# Patient Record
Sex: FEMALE | Race: WHITE | Employment: OTHER | URBAN - METROPOLITAN AREA
[De-identification: names, ages, dates, MRNs, and addresses within clinical notes are randomized per-mention and may not be internally consistent; named-entity substitution may affect disease eponyms.]

---

## 2021-02-05 ENCOUNTER — TELEPHONE (OUTPATIENT)
Dept: PHYSICAL THERAPY | Facility: HOSPITAL | Age: 51
End: 2021-02-05

## 2021-02-05 ENCOUNTER — OFFICE VISIT (OUTPATIENT)
Dept: FAMILY MEDICINE CLINIC | Facility: CLINIC | Age: 51
End: 2021-02-05
Payer: COMMERCIAL

## 2021-02-05 ENCOUNTER — MED REC SCAN ONLY (OUTPATIENT)
Dept: FAMILY MEDICINE CLINIC | Facility: CLINIC | Age: 51
End: 2021-02-05

## 2021-02-05 VITALS
HEIGHT: 64 IN | SYSTOLIC BLOOD PRESSURE: 110 MMHG | WEIGHT: 141 LBS | HEART RATE: 97 BPM | DIASTOLIC BLOOD PRESSURE: 72 MMHG | OXYGEN SATURATION: 98 % | TEMPERATURE: 98 F | BODY MASS INDEX: 24.07 KG/M2 | RESPIRATION RATE: 16 BRPM

## 2021-02-05 DIAGNOSIS — K59.00 CONSTIPATION, UNSPECIFIED CONSTIPATION TYPE: ICD-10-CM

## 2021-02-05 DIAGNOSIS — G89.29 CHRONIC RIGHT-SIDED LOW BACK PAIN WITHOUT SCIATICA: Primary | ICD-10-CM

## 2021-02-05 DIAGNOSIS — M51.36 DISC DEGENERATION, LUMBAR: ICD-10-CM

## 2021-02-05 DIAGNOSIS — M54.50 CHRONIC RIGHT-SIDED LOW BACK PAIN WITHOUT SCIATICA: Primary | ICD-10-CM

## 2021-02-05 PROCEDURE — 99203 OFFICE O/P NEW LOW 30 MIN: CPT | Performed by: FAMILY MEDICINE

## 2021-02-05 PROCEDURE — 3008F BODY MASS INDEX DOCD: CPT | Performed by: FAMILY MEDICINE

## 2021-02-05 PROCEDURE — 3078F DIAST BP <80 MM HG: CPT | Performed by: FAMILY MEDICINE

## 2021-02-05 PROCEDURE — 3074F SYST BP LT 130 MM HG: CPT | Performed by: FAMILY MEDICINE

## 2021-02-05 RX ORDER — DICLOFENAC SODIUM 75 MG/1
75 TABLET, DELAYED RELEASE ORAL 2 TIMES DAILY
Qty: 60 TABLET | Refills: 0 | Status: SHIPPED | OUTPATIENT
Start: 2021-02-05 | End: 2021-03-24

## 2021-02-05 NOTE — PATIENT INSTRUCTIONS
Start diclofenac 75 mg 1 tabletka rano I wieczorem z kori. [facility]ac[facility]. Umowic sie na terapie.

## 2021-02-22 ENCOUNTER — OFFICE VISIT (OUTPATIENT)
Dept: PHYSICAL THERAPY | Facility: HOSPITAL | Age: 51
End: 2021-02-22
Attending: FAMILY MEDICINE
Payer: COMMERCIAL

## 2021-02-22 DIAGNOSIS — M51.36 DISC DEGENERATION, LUMBAR: ICD-10-CM

## 2021-02-22 DIAGNOSIS — G89.29 CHRONIC RIGHT-SIDED LOW BACK PAIN WITHOUT SCIATICA: ICD-10-CM

## 2021-02-22 DIAGNOSIS — M54.50 CHRONIC RIGHT-SIDED LOW BACK PAIN WITHOUT SCIATICA: ICD-10-CM

## 2021-02-22 PROCEDURE — 97161 PT EVAL LOW COMPLEX 20 MIN: CPT

## 2021-02-22 PROCEDURE — 97110 THERAPEUTIC EXERCISES: CPT

## 2021-02-23 NOTE — PROGRESS NOTES
SPINE EVALUATION:   Referring Physician: Dr. Estefany Christianson  Diagnosis: R LBP     Date of Service: 2/22/2021     PATIENT Rosalba Rubio is a 52year old female who presents to therapy today with complaints of R LBP for > 6 months.  Saw 2 chiropractors WFL but with pain provocation at end range flexion and R sidebend in wt bearing.     Segmental mobility at L3,4,5,S1 unremarkable  Palpation: no acute tenderness to palpation    Strength: (* denotes performed with pain)  LE   Hip flexion (L2): R 5/5; L 5/5 Activities, Therapeutic Exercise, Home Exercise Program instruction and Modalities to include: IASTM    Education or treatment limitation: Communication  Rehab Potential:good        Patient/Family/Caregiver was advised of these findings, precautions, and t

## 2021-02-25 ENCOUNTER — OFFICE VISIT (OUTPATIENT)
Dept: PHYSICAL THERAPY | Facility: HOSPITAL | Age: 51
End: 2021-02-25
Attending: FAMILY MEDICINE
Payer: COMMERCIAL

## 2021-02-25 DIAGNOSIS — G89.29 CHRONIC RIGHT-SIDED LOW BACK PAIN WITHOUT SCIATICA: ICD-10-CM

## 2021-02-25 DIAGNOSIS — M51.36 DISC DEGENERATION, LUMBAR: ICD-10-CM

## 2021-02-25 DIAGNOSIS — M54.50 CHRONIC RIGHT-SIDED LOW BACK PAIN WITHOUT SCIATICA: ICD-10-CM

## 2021-02-25 PROCEDURE — 97110 THERAPEUTIC EXERCISES: CPT

## 2021-02-25 PROCEDURE — 97140 MANUAL THERAPY 1/> REGIONS: CPT

## 2021-02-26 NOTE — PROGRESS NOTES
Dx: R LBP        Insurance (Authorized # of Visits):  6          Authorizing Physician: Dr. Tien Pena  Next MD visit: none scheduled  Fall Risk: standard         Precautions: n/a             Subjective:No change from initial visit.  No pain provocation w/

## 2021-03-01 ENCOUNTER — OFFICE VISIT (OUTPATIENT)
Dept: PHYSICAL THERAPY | Facility: HOSPITAL | Age: 51
End: 2021-03-01
Attending: FAMILY MEDICINE
Payer: COMMERCIAL

## 2021-03-01 DIAGNOSIS — G89.29 CHRONIC RIGHT-SIDED LOW BACK PAIN WITHOUT SCIATICA: ICD-10-CM

## 2021-03-01 DIAGNOSIS — M51.36 DISC DEGENERATION, LUMBAR: ICD-10-CM

## 2021-03-01 DIAGNOSIS — M54.50 CHRONIC RIGHT-SIDED LOW BACK PAIN WITHOUT SCIATICA: ICD-10-CM

## 2021-03-01 PROCEDURE — 97140 MANUAL THERAPY 1/> REGIONS: CPT

## 2021-03-02 NOTE — PROGRESS NOTES
Dx: R LBP        Insurance (Authorized # of Visits):  6          Authorizing Physician: Dr. Jami May MD visit: none scheduled  Fall Risk: standard         Precautions: n/a             Subjective:Pain was less Sat and Sunday. Rates at 2/10.  Increase limited in rotation ex  Charges: MT3      Total Timed Treatment: 38min  Total Treatment Time: 45 min

## 2021-03-04 ENCOUNTER — OFFICE VISIT (OUTPATIENT)
Dept: PHYSICAL THERAPY | Facility: HOSPITAL | Age: 51
End: 2021-03-04
Attending: FAMILY MEDICINE
Payer: COMMERCIAL

## 2021-03-04 DIAGNOSIS — G89.29 CHRONIC RIGHT-SIDED LOW BACK PAIN WITHOUT SCIATICA: ICD-10-CM

## 2021-03-04 DIAGNOSIS — M51.36 DISC DEGENERATION, LUMBAR: ICD-10-CM

## 2021-03-04 DIAGNOSIS — M54.50 CHRONIC RIGHT-SIDED LOW BACK PAIN WITHOUT SCIATICA: ICD-10-CM

## 2021-03-04 PROCEDURE — 97140 MANUAL THERAPY 1/> REGIONS: CPT

## 2021-03-05 NOTE — PROGRESS NOTES
Dx: R LBP        Insurance (Authorized # of Visits):  6          Authorizing Physician: Dr. Renata May MD visit: none scheduled  Fall Risk: standard         Precautions: n/a             Subjective: 2 days of decreased pain following last vist but mercedes

## 2021-03-08 ENCOUNTER — OFFICE VISIT (OUTPATIENT)
Dept: PHYSICAL THERAPY | Facility: HOSPITAL | Age: 51
End: 2021-03-08
Attending: FAMILY MEDICINE
Payer: COMMERCIAL

## 2021-03-08 DIAGNOSIS — M54.50 CHRONIC RIGHT-SIDED LOW BACK PAIN WITHOUT SCIATICA: ICD-10-CM

## 2021-03-08 DIAGNOSIS — G89.29 CHRONIC RIGHT-SIDED LOW BACK PAIN WITHOUT SCIATICA: ICD-10-CM

## 2021-03-08 DIAGNOSIS — M51.36 DISC DEGENERATION, LUMBAR: ICD-10-CM

## 2021-03-08 PROCEDURE — 97140 MANUAL THERAPY 1/> REGIONS: CPT

## 2021-03-08 PROCEDURE — 97035 APP MDLTY 1+ULTRASOUND EA 15: CPT

## 2021-03-09 NOTE — PROGRESS NOTES
Dx: R LBP        Insurance (Authorized # of Visits):  6          Authorizing Physician: Dr. Maria Ines Sharma  Next MD visit: none scheduled  Fall Risk: standard         Precautions: n/a             Subjective:Pain increased on Sat and Sun requiring medication.  D glut, thoraco lumbar paraspinals in prone over pillow  Cont'd w/ manual STM and concluded w/ ice massage                         HEP: cat/camel added, ROM limited in rotation ex  Charges: MT2, US      Total Timed Treatment: 38min  Total Treatment Time: 50

## 2021-03-11 ENCOUNTER — OFFICE VISIT (OUTPATIENT)
Dept: PHYSICAL THERAPY | Facility: HOSPITAL | Age: 51
End: 2021-03-11
Attending: FAMILY MEDICINE
Payer: COMMERCIAL

## 2021-03-11 DIAGNOSIS — G89.29 CHRONIC RIGHT-SIDED LOW BACK PAIN WITHOUT SCIATICA: ICD-10-CM

## 2021-03-11 DIAGNOSIS — M51.36 DISC DEGENERATION, LUMBAR: ICD-10-CM

## 2021-03-11 DIAGNOSIS — M54.50 CHRONIC RIGHT-SIDED LOW BACK PAIN WITHOUT SCIATICA: ICD-10-CM

## 2021-03-11 PROCEDURE — 97110 THERAPEUTIC EXERCISES: CPT

## 2021-03-12 NOTE — PROGRESS NOTES
Progress Summary  Pt has attended 6,visits in Physical Therapy. Subjective: No significant pain relief with PT so far. Has good days and bad days  Without known pattern. Pain continues on R side. Would like to try more exercise if that would help.  Do contact me at Dept: [phone]. Sincerely,  Electronically signed by therapist: Indy Kong PT    Physician's certification required: Yes  Please co-sign or sign and return this letter via fax as soon as possible to [phone].    I certify the ne

## 2021-03-15 ENCOUNTER — APPOINTMENT (OUTPATIENT)
Dept: PHYSICAL THERAPY | Facility: HOSPITAL | Age: 51
End: 2021-03-15
Attending: FAMILY MEDICINE
Payer: COMMERCIAL

## 2021-03-18 ENCOUNTER — APPOINTMENT (OUTPATIENT)
Dept: PHYSICAL THERAPY | Facility: HOSPITAL | Age: 51
End: 2021-03-18
Attending: FAMILY MEDICINE
Payer: COMMERCIAL

## 2021-03-24 RX ORDER — DICLOFENAC SODIUM 75 MG/1
75 TABLET, DELAYED RELEASE ORAL 2 TIMES DAILY
Qty: 60 TABLET | Refills: 0 | Status: SHIPPED | OUTPATIENT
Start: 2021-03-24 | End: 2021-05-13 | Stop reason: DRUGHIGH

## 2021-03-24 NOTE — TELEPHONE ENCOUNTER
Pt requesting a refill of     Diclofenac Sodium 75 MG Oral Tab EC 60 tablet 0 2/5/2021     Sig - Route:  Take 1 tablet (75 mg total) by mouth 2 (two) times daily. - Oral    Sent to pharmacy as: Diclofenac Sodium 75 MG Oral Tablet Delayed Release (VOLTAREN)

## 2021-04-27 ENCOUNTER — TELEPHONE (OUTPATIENT)
Dept: FAMILY MEDICINE CLINIC | Facility: CLINIC | Age: 51
End: 2021-04-27

## 2021-04-27 RX ORDER — DICLOFENAC SODIUM 75 MG/1
75 TABLET, DELAYED RELEASE ORAL 2 TIMES DAILY
Qty: 30 TABLET | Refills: 0 | Status: SHIPPED | OUTPATIENT
Start: 2021-04-27 | End: 2021-05-13

## 2021-04-27 NOTE — TELEPHONE ENCOUNTER
I have called in diclofenac 30 tablets for this patient so she can continue medication until seen in the office for reevaluation.   Thank you

## 2021-04-27 NOTE — TELEPHONE ENCOUNTER
PT would like refill of Diclofenac Sodium 75 MG Oral Tab EC sent to [facility] [id number] [facility][id number] Rommel Meraz, [address] AT [address], [phone], [phone] she is out of medication

## 2021-04-27 NOTE — TELEPHONE ENCOUNTER
Patient was asked to return post PT at last OV on 2/5/21; Patient has a follow-up appointment r/t her back on 5/13/2021. Is it ok to fill the Diclofenac Sodium 75 MG Oral Tab EC-->Sig:   Take 1 tablet (75 mg total) by mouth 2 (two) times daily.     Dr. Enmanuel Galicia

## 2021-04-28 NOTE — TELEPHONE ENCOUNTER
Call to pt-sts does not need  today. Advised of info noted below from dr Vu Pham re refill request.  Patient voices understanding/agrees with plan/no further questions.

## 2021-04-30 RX ORDER — DICLOFENAC SODIUM 75 MG/1
TABLET, DELAYED RELEASE ORAL
Qty: 60 TABLET | Refills: 0 | OUTPATIENT
Start: 2021-04-30

## 2021-05-05 ENCOUNTER — IMMUNIZATION (OUTPATIENT)
Dept: LAB | Age: 51
End: 2021-05-05

## 2021-05-05 DIAGNOSIS — Z23 NEED FOR VACCINATION: Primary | ICD-10-CM

## 2021-05-05 PROCEDURE — 91300 COVID 19 PFIZER-BIONTECH: CPT

## 2021-05-05 PROCEDURE — 0001A COVID 19 PFIZER-BIONTECH: CPT

## 2021-05-13 ENCOUNTER — OFFICE VISIT (OUTPATIENT)
Dept: FAMILY MEDICINE CLINIC | Facility: CLINIC | Age: 51
End: 2021-05-13
Payer: COMMERCIAL

## 2021-05-13 VITALS
TEMPERATURE: 97 F | WEIGHT: 144.5 LBS | RESPIRATION RATE: 16 BRPM | HEART RATE: 80 BPM | OXYGEN SATURATION: 99 % | BODY MASS INDEX: 24.67 KG/M2 | HEIGHT: 64 IN | DIASTOLIC BLOOD PRESSURE: 70 MMHG | SYSTOLIC BLOOD PRESSURE: 120 MMHG

## 2021-05-13 DIAGNOSIS — G89.29 CHRONIC RIGHT-SIDED LOW BACK PAIN WITHOUT SCIATICA: ICD-10-CM

## 2021-05-13 DIAGNOSIS — M51.36 DISC DEGENERATION, LUMBAR: Primary | ICD-10-CM

## 2021-05-13 DIAGNOSIS — M54.50 CHRONIC RIGHT-SIDED LOW BACK PAIN WITHOUT SCIATICA: ICD-10-CM

## 2021-05-13 DIAGNOSIS — R23.2 HOT FLASHES: ICD-10-CM

## 2021-05-13 DIAGNOSIS — R14.0 ABDOMINAL BLOATING: ICD-10-CM

## 2021-05-13 PROCEDURE — 3008F BODY MASS INDEX DOCD: CPT | Performed by: FAMILY MEDICINE

## 2021-05-13 PROCEDURE — 3078F DIAST BP <80 MM HG: CPT | Performed by: FAMILY MEDICINE

## 2021-05-13 PROCEDURE — 99214 OFFICE O/P EST MOD 30 MIN: CPT | Performed by: FAMILY MEDICINE

## 2021-05-13 PROCEDURE — 3074F SYST BP LT 130 MM HG: CPT | Performed by: FAMILY MEDICINE

## 2021-05-13 RX ORDER — DICLOFENAC SODIUM 75 MG/1
75 TABLET, DELAYED RELEASE ORAL 2 TIMES DAILY
Qty: 30 TABLET | Refills: 1 | Status: SHIPPED | OUTPATIENT
Start: 2021-05-13 | End: 2021-10-25

## 2021-05-13 NOTE — PATIENT INSTRUCTIONS
Umowic sie na X- ray back. Diclofenac 75 mg 1 tab rano I wieczorem ligia potrzephilip z kori. Estroven Hot flashes na uderzenia ronaldaca. Probiotic- Align, Culturelle- na travienie/gazy. Gas X- ligia potrzeba na wzdecia/gaz. Anne Agent.    Pic [address]

## 2021-05-13 NOTE — PROGRESS NOTES
Jade Current is a 54year old female. cc low back pain, hot flashes, gas/bloating  HPI:   Patient is coming to the office for follow-up of the low back pain.   She has back pain which is on the right side going to the hip most of the time it is in the lower b rashes  HEENT no congestion no runny nose no sore throat  Neck no neck pain  RESPIRATORY: denies shortness of breath with exertion  CARDIOVASCULAR: denies chest pain on exertion  GI: denies abdominal pain and denies heartburn, abdominal bloating  NEURO: de Schedule x-ray of your lumbar spine. Diclofenac 75 mg take 1 tablet twice a day as needed with food. Estroven hot flashes use as needed for hot flashes. Probiotic align Culturelle for digestion/excessive gas  Gas-X as needed for excessive gas.   Heal

## 2021-05-26 ENCOUNTER — IMMUNIZATION (OUTPATIENT)
Dept: LAB | Age: 51
End: 2021-05-26
Attending: HOSPITALIST

## 2021-05-26 DIAGNOSIS — Z23 NEED FOR VACCINATION: Primary | ICD-10-CM

## 2021-05-26 PROCEDURE — 0002A COVID 19 PFIZER-BIONTECH: CPT | Performed by: HOSPITALIST

## 2021-05-26 PROCEDURE — 91300 COVID 19 PFIZER-BIONTECH: CPT | Performed by: HOSPITALIST

## 2021-07-19 ENCOUNTER — TELEPHONE (OUTPATIENT)
Dept: FAMILY MEDICINE CLINIC | Facility: CLINIC | Age: 51
End: 2021-07-19

## 2021-07-19 DIAGNOSIS — G89.29 CHRONIC RIGHT-SIDED LOW BACK PAIN WITHOUT SCIATICA: Primary | ICD-10-CM

## 2021-07-19 DIAGNOSIS — M54.50 CHRONIC RIGHT-SIDED LOW BACK PAIN WITHOUT SCIATICA: Primary | ICD-10-CM

## 2021-07-19 DIAGNOSIS — R93.7 ABNORMAL FINDINGS ON DIAGNOSTIC IMAGING OF SPINE: ICD-10-CM

## 2021-07-19 NOTE — TELEPHONE ENCOUNTER
Pt requesting referral to neurosurgeon Dr. Oneill Necessary, [address] [facility], [address] [place] [address] per Dr. Marlin Irizarry recommendation. Pt has scheduled an appt this Thursday 7/22/2021 with neurosurgeon.

## 2021-07-20 NOTE — TELEPHONE ENCOUNTER
Record shows L/S xr results w comments from dr Patrick Casillas advising referral to dr Mariam Sarkar but no referral order placed. Referral order placed now w same dx and abnormal imaging.    Call to pt to confirm she has spoken w insurance to confirm this provider

## 2021-07-20 NOTE — TELEPHONE ENCOUNTER
Pt returned call. Notified pt that referral was placed and confirmed with patient that she will check with her insurance coverage to verify if Dr. Priscila Nguyen is in network.

## 2021-07-21 NOTE — TELEPHONE ENCOUNTER
Pt states when she called to scheduled an appt, she was placed with the physician assistant. Pt was wondering if this was okay.      Please advise

## 2021-07-21 NOTE — TELEPHONE ENCOUNTER
Updated dr Geovanny Washington noted below. She confirms appt w PA is fine-will still be under the supervision of dr Emelia Loredo. Call to pt-advised of above info from dr Chuy Otero. Patient voices understanding/agrees with plan/no further questions.

## 2021-07-22 ENCOUNTER — TELEPHONE (OUTPATIENT)
Dept: SURGERY | Facility: CLINIC | Age: 51
End: 2021-07-22

## 2021-07-22 ENCOUNTER — OFFICE VISIT (OUTPATIENT)
Dept: SURGERY | Facility: CLINIC | Age: 51
End: 2021-07-22
Payer: COMMERCIAL

## 2021-07-22 VITALS
SYSTOLIC BLOOD PRESSURE: 110 MMHG | HEIGHT: 64 IN | WEIGHT: 144 LBS | DIASTOLIC BLOOD PRESSURE: 70 MMHG | BODY MASS INDEX: 24.59 KG/M2

## 2021-07-22 DIAGNOSIS — M54.50 CHRONIC BILATERAL LOW BACK PAIN WITHOUT SCIATICA: Primary | ICD-10-CM

## 2021-07-22 DIAGNOSIS — M51.36 DDD (DEGENERATIVE DISC DISEASE), LUMBAR: ICD-10-CM

## 2021-07-22 DIAGNOSIS — G89.29 CHRONIC BILATERAL LOW BACK PAIN WITHOUT SCIATICA: Primary | ICD-10-CM

## 2021-07-22 PROCEDURE — 99203 OFFICE O/P NEW LOW 30 MIN: CPT | Performed by: PHYSICIAN ASSISTANT

## 2021-07-22 PROCEDURE — 3074F SYST BP LT 130 MM HG: CPT | Performed by: PHYSICIAN ASSISTANT

## 2021-07-22 PROCEDURE — 3078F DIAST BP <80 MM HG: CPT | Performed by: PHYSICIAN ASSISTANT

## 2021-07-22 PROCEDURE — 3008F BODY MASS INDEX DOCD: CPT | Performed by: PHYSICIAN ASSISTANT

## 2021-07-22 RX ORDER — CYCLOBENZAPRINE HCL 10 MG
10 TABLET ORAL NIGHTLY PRN
Qty: 30 TABLET | Refills: 0 | Status: SHIPPED | OUTPATIENT
Start: 2021-07-22 | End: 2021-12-01 | Stop reason: ALTCHOICE

## 2021-07-22 NOTE — TELEPHONE ENCOUNTER
Pt brought in images on disc and report.   Uploaded to [employer], disc returned to pt at [facility]

## 2021-07-22 NOTE — H&P
Neurosurgery Clinic Visit  2021    Son [place] PCP:  Brittany Madsen MD    1970 MRN [id number]       CC:  Back Pain    HPI:    Lefty Hamilton is a very pleasant 54year old female who presents with worsening chronic LBP.   We used the [employer] Used    Vaping Use      Vaping Use: Never used    Substance and Sexual Activity      Alcohol use: Never      Drug use: Never      Sexual activity: Yes        Partners: Male    Other Topics      Concerns:        Caffeine Concern: No        Exercise:  Yes chiro.  She is not interested in surgery at this point. Recommend Pain Eval for injection recommendations. Rx flexeril, she will trial at night.   She will call with clinical update, if she is interested in surgery we will order updated MRI Lumbar and hav

## 2021-07-22 NOTE — PROGRESS NOTES
Ongoing back pain  No sx  No injections  Has had some PT  chiro    Lower back especially Right side  When the back pain flares up she feels weak all over   Morning is the worst pain  Sometimes at night at the end of the day can be bad as well

## 2021-10-25 ENCOUNTER — OFFICE VISIT (OUTPATIENT)
Dept: FAMILY MEDICINE CLINIC | Facility: CLINIC | Age: 51
End: 2021-10-25
Payer: COMMERCIAL

## 2021-10-25 VITALS
BODY MASS INDEX: 22.68 KG/M2 | WEIGHT: 137.81 LBS | TEMPERATURE: 99 F | OXYGEN SATURATION: 97 % | HEART RATE: 87 BPM | DIASTOLIC BLOOD PRESSURE: 72 MMHG | SYSTOLIC BLOOD PRESSURE: 122 MMHG | RESPIRATION RATE: 20 BRPM | HEIGHT: 65.5 IN

## 2021-10-25 DIAGNOSIS — R05.9 COUGH: ICD-10-CM

## 2021-10-25 DIAGNOSIS — J02.0 STREP PHARYNGITIS: Primary | ICD-10-CM

## 2021-10-25 DIAGNOSIS — R11.0 NAUSEA IN ADULT: ICD-10-CM

## 2021-10-25 DIAGNOSIS — J02.9 SORE THROAT: ICD-10-CM

## 2021-10-25 PROCEDURE — 3078F DIAST BP <80 MM HG: CPT | Performed by: NURSE PRACTITIONER

## 2021-10-25 PROCEDURE — 87880 STREP A ASSAY W/OPTIC: CPT | Performed by: NURSE PRACTITIONER

## 2021-10-25 PROCEDURE — 3008F BODY MASS INDEX DOCD: CPT | Performed by: NURSE PRACTITIONER

## 2021-10-25 PROCEDURE — 99213 OFFICE O/P EST LOW 20 MIN: CPT | Performed by: NURSE PRACTITIONER

## 2021-10-25 PROCEDURE — 3074F SYST BP LT 130 MM HG: CPT | Performed by: NURSE PRACTITIONER

## 2021-10-25 RX ORDER — AMOXICILLIN 500 MG/1
500 CAPSULE ORAL 2 TIMES DAILY
Qty: 20 CAPSULE | Refills: 0 | Status: SHIPPED | OUTPATIENT
Start: 2021-10-25 | End: 2021-11-04

## 2021-10-25 RX ORDER — BENZONATATE 200 MG/1
200 CAPSULE ORAL 3 TIMES DAILY PRN
Qty: 30 CAPSULE | Refills: 0 | Status: SHIPPED | OUTPATIENT
Start: 2021-10-25 | End: 2021-12-01 | Stop reason: ALTCHOICE

## 2021-10-25 NOTE — PROGRESS NOTES
CHIEF COMPLAINT:   Patient presents with:  Sore Throat: cough x5days. loss of voice. No fever. no sick exposure. Hx of strep. taking Ibuprofen. DayQuil. HPI:   Marlo Bumpers is a 54year old female presents to clinic with symptoms of sore throat.  Osito EOM intact  EARS: TM's clear, non-injected, no bulging, retraction, or fluid  NOSE: nostrils patent, no exudates, nasal mucosa pink and noninflamed  THROAT: oral mucosa pink, moist. Posterior pharynx erythematous and injected. No exudates. Tonsils 2/4.   Br 100.4 persists for 72 hours. The patient/parent indicates understanding of these issues and agrees to the plan. The patient is asked to follow up with their PCP prn.     Patient Instructions     Self-Care for Sore Throats  Sore throats happen for many you.   Prevent future sore throats  Prevention tips include:  · Stop smoking or reduce contact with secondhand smoke. Smoke irritates the tender throat lining. · Limit contact with pets and with allergy-causing substances, such as pollen and mold.   · Wash

## 2021-11-30 PROCEDURE — 87624 HPV HI-RISK TYP POOLED RSLT: CPT | Performed by: OBSTETRICS & GYNECOLOGY

## 2021-11-30 PROCEDURE — 88175 CYTOPATH C/V AUTO FLUID REDO: CPT | Performed by: OBSTETRICS & GYNECOLOGY

## 2021-12-01 ENCOUNTER — OFFICE VISIT (OUTPATIENT)
Dept: FAMILY MEDICINE CLINIC | Facility: CLINIC | Age: 51
End: 2021-12-01
Payer: COMMERCIAL

## 2021-12-01 ENCOUNTER — LAB ENCOUNTER (OUTPATIENT)
Dept: LAB | Age: 51
End: 2021-12-01
Attending: OBSTETRICS & GYNECOLOGY
Payer: COMMERCIAL

## 2021-12-01 VITALS
SYSTOLIC BLOOD PRESSURE: 108 MMHG | HEIGHT: 65.5 IN | TEMPERATURE: 98 F | WEIGHT: 138 LBS | BODY MASS INDEX: 22.72 KG/M2 | HEART RATE: 72 BPM | RESPIRATION RATE: 16 BRPM | DIASTOLIC BLOOD PRESSURE: 70 MMHG | OXYGEN SATURATION: 99 %

## 2021-12-01 DIAGNOSIS — R53.83 FATIGUE, UNSPECIFIED TYPE: ICD-10-CM

## 2021-12-01 DIAGNOSIS — Z00.00 PHYSICAL EXAM, ANNUAL: Primary | ICD-10-CM

## 2021-12-01 DIAGNOSIS — Z00.00 LABORATORY TESTS ORDERED AS PART OF A COMPLETE PHYSICAL EXAM (CPE): ICD-10-CM

## 2021-12-01 DIAGNOSIS — N92.4 ABNORMAL PERIMENOPAUSAL BLEEDING: ICD-10-CM

## 2021-12-01 DIAGNOSIS — Z12.31 ENCOUNTER FOR SCREENING MAMMOGRAM FOR BREAST CANCER: ICD-10-CM

## 2021-12-01 PROCEDURE — 3074F SYST BP LT 130 MM HG: CPT | Performed by: FAMILY MEDICINE

## 2021-12-01 PROCEDURE — 85027 COMPLETE CBC AUTOMATED: CPT

## 2021-12-01 PROCEDURE — 3078F DIAST BP <80 MM HG: CPT | Performed by: FAMILY MEDICINE

## 2021-12-01 PROCEDURE — 83001 ASSAY OF GONADOTROPIN (FSH): CPT

## 2021-12-01 PROCEDURE — 3008F BODY MASS INDEX DOCD: CPT | Performed by: FAMILY MEDICINE

## 2021-12-01 PROCEDURE — 84443 ASSAY THYROID STIM HORMONE: CPT

## 2021-12-01 PROCEDURE — 99396 PREV VISIT EST AGE 40-64: CPT | Performed by: FAMILY MEDICINE

## 2021-12-01 RX ORDER — MULTIVIT WITH MINERALS/LUTEIN
1000 TABLET ORAL DAILY
COMMUNITY

## 2021-12-01 RX ORDER — MULTIVIT-MIN/IRON/FOLIC ACID/K 18-600-40
1 CAPSULE ORAL DAILY
COMMUNITY

## 2021-12-01 NOTE — PROGRESS NOTES
HPI:   Raghav Hsu is a 54year old female who presents for a complete physical exam. Symptoms: denies discharge, itching, burning or dysuria.  Patient has never had a mammogram done we will place an order for mammogram.  Patient will check with her insur denies abdominal pain,denies heartburn  : denies dysuria, vaginal discharge or itching,  MUSCULOSKELETAL: denies back pain  NEURO: denies headaches  PSYCHE: denies depression or anxiety  HEMATOLOGIC: denies hx of anemia  ENDOCRINE: denies thyroid history diet.  Stay active. Imaging & Consults:  [facility] PORFIRIO 2D+3D SCREENING BILAT (CPT=77067/68631)     Pap and pelvic by GYN. . Order put in for mammogram a. Self breast exam explained. Health maintenance, will check fasting Lipids, CMP, and CBC.  Pt referred f

## 2021-12-29 ENCOUNTER — LAB ENCOUNTER (OUTPATIENT)
Dept: LAB | Age: 51
End: 2021-12-29
Attending: FAMILY MEDICINE
Payer: COMMERCIAL

## 2021-12-29 DIAGNOSIS — Z00.00 LABORATORY TESTS ORDERED AS PART OF A COMPLETE PHYSICAL EXAM (CPE): ICD-10-CM

## 2021-12-29 DIAGNOSIS — R53.83 FATIGUE, UNSPECIFIED TYPE: ICD-10-CM

## 2021-12-29 LAB
ALBUMIN SERPL-MCNC: 4 G/DL (ref 3.4–5)
ALBUMIN/GLOB SERPL: 1.1 {RATIO} (ref 1–2)
ALP LIVER SERPL-CCNC: 60 U/L
ALT SERPL-CCNC: 27 U/L
ANION GAP SERPL CALC-SCNC: 6 MMOL/L (ref 0–18)
AST SERPL-CCNC: 16 U/L (ref 15–37)
BILIRUB SERPL-MCNC: 1 MG/DL (ref 0.1–2)
BILIRUB UR QL STRIP.AUTO: NEGATIVE
BUN BLD-MCNC: 15 MG/DL (ref 7–18)
CALCIUM BLD-MCNC: 9.6 MG/DL (ref 8.5–10.1)
CHLORIDE SERPL-SCNC: 104 MMOL/L (ref 98–112)
CHOLEST SERPL-MCNC: 252 MG/DL (ref ?–200)
CO2 SERPL-SCNC: 31 MMOL/L (ref 21–32)
COLOR UR AUTO: YELLOW
CREAT BLD-MCNC: 0.61 MG/DL
FASTING PATIENT LIPID ANSWER: YES
FASTING STATUS PATIENT QL REPORTED: YES
GLOBULIN PLAS-MCNC: 3.8 G/DL (ref 2.8–4.4)
GLUCOSE BLD-MCNC: 97 MG/DL (ref 70–99)
GLUCOSE UR STRIP.AUTO-MCNC: NEGATIVE MG/DL
HDLC SERPL-MCNC: 107 MG/DL (ref 40–59)
KETONES UR STRIP.AUTO-MCNC: NEGATIVE MG/DL
LDLC SERPL CALC-MCNC: 120 MG/DL (ref ?–100)
LEUKOCYTE ESTERASE UR QL STRIP.AUTO: NEGATIVE
NITRITE UR QL STRIP.AUTO: NEGATIVE
NONHDLC SERPL-MCNC: 145 MG/DL (ref ?–130)
OSMOLALITY SERPL CALC.SUM OF ELEC: 293 MOSM/KG (ref 275–295)
PH UR STRIP.AUTO: 7 [PH] (ref 5–8)
POTASSIUM SERPL-SCNC: 4.1 MMOL/L (ref 3.5–5.1)
PROT SERPL-MCNC: 7.8 G/DL (ref 6.4–8.2)
PROT UR STRIP.AUTO-MCNC: NEGATIVE MG/DL
RBC UR QL AUTO: NEGATIVE
SODIUM SERPL-SCNC: 141 MMOL/L (ref 136–145)
SP GR UR STRIP.AUTO: 1.02 (ref 1–1.03)
TRIGL SERPL-MCNC: 149 MG/DL (ref 30–149)
UROBILINOGEN UR STRIP.AUTO-MCNC: <2 MG/DL
VIT B12 SERPL-MCNC: 790 PG/ML (ref 193–986)
VLDLC SERPL CALC-MCNC: 26 MG/DL (ref 0–30)

## 2021-12-29 PROCEDURE — 82607 VITAMIN B-12: CPT

## 2021-12-29 PROCEDURE — 80061 LIPID PANEL: CPT

## 2021-12-29 PROCEDURE — 81003 URINALYSIS AUTO W/O SCOPE: CPT

## 2021-12-29 PROCEDURE — 80053 COMPREHEN METABOLIC PANEL: CPT

## 2022-01-18 ENCOUNTER — TELEPHONE (OUTPATIENT)
Dept: FAMILY MEDICINE CLINIC | Facility: CLINIC | Age: 52
End: 2022-01-18

## 2022-01-18 NOTE — TELEPHONE ENCOUNTER
Pt called and stated that she is having the same throat pain. Pt asked if Dr. Lin Lindsey can just refill the antibiotic she's already prescribed her. I told pt she'll have to be seen and assessed.     Pt then declined and said Dr. Lin Lindsey can do

## 2022-01-18 NOTE — TELEPHONE ENCOUNTER
S/W pt and she C/O ST and itchy throat since last NOC. Pt denies any Fever, swollen glands, cough, HAs or any other URI symptoms. Pt states she just want to start on an ABX that she was given before. Informed pt that she will need to be seen.   She refus

## 2022-01-19 ENCOUNTER — HOSPITAL ENCOUNTER (OUTPATIENT)
Age: 52
Discharge: HOME | End: 2022-01-19
Payer: COMMERCIAL

## 2022-01-19 VITALS
SYSTOLIC BLOOD PRESSURE: 114 MMHG | HEART RATE: 80 BPM | TEMPERATURE: 99 F | WEIGHT: 137 LBS | OXYGEN SATURATION: 98 % | BODY MASS INDEX: 22 KG/M2 | DIASTOLIC BLOOD PRESSURE: 71 MMHG | RESPIRATION RATE: 18 BRPM

## 2022-01-19 DIAGNOSIS — J02.9 ACUTE VIRAL PHARYNGITIS: Primary | ICD-10-CM

## 2022-01-19 LAB — S PYO AG THROAT QL: NEGATIVE

## 2022-01-19 PROCEDURE — 99213 OFFICE O/P EST LOW 20 MIN: CPT

## 2022-01-19 PROCEDURE — 87880 STREP A ASSAY W/OPTIC: CPT

## 2022-01-19 PROCEDURE — 99212 OFFICE O/P EST SF 10 MIN: CPT

## 2022-01-19 RX ORDER — FLUTICASONE PROPIONATE 50 MCG
2 SPRAY, SUSPENSION (ML) NASAL DAILY
Qty: 16 G | Refills: 0 | Status: SHIPPED | OUTPATIENT
Start: 2022-01-19 | End: 2022-02-18

## 2022-01-19 NOTE — ED PROVIDER NOTES
Patient Seen in: [facility]      History   Patient presents with:  Sore Throat    Stated Complaint: sorethroat    Subjective:   57-year-old female presents today with complaints of sore throat and a slight cough.   Patient denies any fever Normocephalic. Right Ear: Tympanic membrane and ear canal normal.      Left Ear: Tympanic membrane and ear canal normal.      Nose: Mucosal edema present. Mouth/Throat:      Pharynx: Uvula midline. Posterior oropharyngeal erythema present.    Eyes Christelle Traylor MD    Jo Purvis [phone]    In 1 week  As needed          Medications Prescribed:  Current Discharge Medication List    START taking these medications    fluticasone propionate 50 MCG/ACT Nasal Suspension  2 sp

## 2022-01-19 NOTE — ED INITIAL ASSESSMENT (HPI)
Pt aox4. Pt c/o sore throat and selam ear pressure started yesterday. Pt denies fever. Pt vaccinated for Covid.

## 2022-12-17 ENCOUNTER — OFFICE VISIT (OUTPATIENT)
Dept: FAMILY MEDICINE CLINIC | Facility: CLINIC | Age: 52
End: 2022-12-17
Payer: COMMERCIAL

## 2022-12-17 VITALS
DIASTOLIC BLOOD PRESSURE: 72 MMHG | HEART RATE: 68 BPM | HEIGHT: 65.5 IN | SYSTOLIC BLOOD PRESSURE: 110 MMHG | WEIGHT: 137 LBS | RESPIRATION RATE: 14 BRPM | BODY MASS INDEX: 22.55 KG/M2 | TEMPERATURE: 98 F

## 2022-12-17 DIAGNOSIS — Z13.89 SCREENING FOR GENITOURINARY CONDITION: ICD-10-CM

## 2022-12-17 DIAGNOSIS — Z12.4 SCREENING FOR CERVICAL CANCER: ICD-10-CM

## 2022-12-17 DIAGNOSIS — Z00.00 PHYSICAL EXAM, ANNUAL: Primary | ICD-10-CM

## 2022-12-17 DIAGNOSIS — Z12.11 SCREEN FOR COLON CANCER: ICD-10-CM

## 2022-12-17 DIAGNOSIS — Z12.31 SCREENING MAMMOGRAM FOR BREAST CANCER: ICD-10-CM

## 2022-12-17 DIAGNOSIS — Z00.00 LABORATORY EXAMINATION ORDERED AS PART OF A ROUTINE GENERAL MEDICAL EXAMINATION: ICD-10-CM

## 2022-12-17 PROCEDURE — 3008F BODY MASS INDEX DOCD: CPT | Performed by: FAMILY MEDICINE

## 2022-12-17 PROCEDURE — 3078F DIAST BP <80 MM HG: CPT | Performed by: FAMILY MEDICINE

## 2022-12-17 PROCEDURE — 3074F SYST BP LT 130 MM HG: CPT | Performed by: FAMILY MEDICINE

## 2022-12-17 PROCEDURE — 99396 PREV VISIT EST AGE 40-64: CPT | Performed by: FAMILY MEDICINE

## 2022-12-30 ENCOUNTER — LAB ENCOUNTER (OUTPATIENT)
Dept: LAB | Age: 52
End: 2022-12-30
Attending: FAMILY MEDICINE
Payer: COMMERCIAL

## 2022-12-30 DIAGNOSIS — Z00.00 LABORATORY EXAMINATION ORDERED AS PART OF A ROUTINE GENERAL MEDICAL EXAMINATION: ICD-10-CM

## 2022-12-30 DIAGNOSIS — Z13.89 SCREENING FOR GENITOURINARY CONDITION: ICD-10-CM

## 2022-12-30 DIAGNOSIS — Z12.11 SCREEN FOR COLON CANCER: ICD-10-CM

## 2022-12-30 LAB
ALBUMIN SERPL-MCNC: 4.1 G/DL (ref 3.4–5)
ALBUMIN/GLOB SERPL: 1.3 {RATIO} (ref 1–2)
ALP LIVER SERPL-CCNC: 70 U/L
ALT SERPL-CCNC: 33 U/L
ANION GAP SERPL CALC-SCNC: 6 MMOL/L (ref 0–18)
AST SERPL-CCNC: 17 U/L (ref 15–37)
BASOPHILS # BLD AUTO: 0.05 X10(3) UL (ref 0–0.2)
BASOPHILS NFR BLD AUTO: 0.8 %
BILIRUB SERPL-MCNC: 1.2 MG/DL (ref 0.1–2)
BILIRUB UR QL: NEGATIVE
BUN BLD-MCNC: 17 MG/DL (ref 7–18)
BUN/CREAT SERPL: 25.8 (ref 10–20)
CALCIUM BLD-MCNC: 9.5 MG/DL (ref 8.5–10.1)
CHLORIDE SERPL-SCNC: 107 MMOL/L (ref 98–112)
CHOLEST SERPL-MCNC: 226 MG/DL (ref ?–200)
CLARITY UR: CLEAR
CO2 SERPL-SCNC: 29 MMOL/L (ref 21–32)
COLOR UR: YELLOW
CREAT BLD-MCNC: 0.66 MG/DL
DEPRECATED RDW RBC AUTO: 47.8 FL (ref 35.1–46.3)
EOSINOPHIL # BLD AUTO: 0.14 X10(3) UL (ref 0–0.7)
EOSINOPHIL NFR BLD AUTO: 2.4 %
ERYTHROCYTE [DISTWIDTH] IN BLOOD BY AUTOMATED COUNT: 13.8 % (ref 11–15)
FASTING PATIENT LIPID ANSWER: YES
FASTING STATUS PATIENT QL REPORTED: YES
GFR SERPLBLD BASED ON 1.73 SQ M-ARVRAT: 107 ML/MIN/1.73M2 (ref 60–?)
GLOBULIN PLAS-MCNC: 3.1 G/DL (ref 2.8–4.4)
GLUCOSE BLD-MCNC: 95 MG/DL (ref 70–99)
GLUCOSE UR-MCNC: NEGATIVE MG/DL
HCT VFR BLD AUTO: 40.4 %
HDLC SERPL-MCNC: 108 MG/DL (ref 40–59)
HEMOCCULT STL QL: POSITIVE
HGB BLD-MCNC: 13.2 G/DL
HGB UR QL STRIP.AUTO: NEGATIVE
IMM GRANULOCYTES # BLD AUTO: 0 X10(3) UL (ref 0–1)
IMM GRANULOCYTES NFR BLD: 0 %
KETONES UR-MCNC: NEGATIVE MG/DL
LDLC SERPL CALC-MCNC: 106 MG/DL (ref ?–100)
LEUKOCYTE ESTERASE UR QL STRIP.AUTO: NEGATIVE
LYMPHOCYTES # BLD AUTO: 1.77 X10(3) UL (ref 1–4)
LYMPHOCYTES NFR BLD AUTO: 30 %
MCH RBC QN AUTO: 30.8 PG (ref 26–34)
MCHC RBC AUTO-ENTMCNC: 32.7 G/DL (ref 31–37)
MCV RBC AUTO: 94.2 FL
MONOCYTES # BLD AUTO: 0.51 X10(3) UL (ref 0.1–1)
MONOCYTES NFR BLD AUTO: 8.6 %
NEUTROPHILS # BLD AUTO: 3.43 X10 (3) UL (ref 1.5–7.7)
NEUTROPHILS # BLD AUTO: 3.43 X10(3) UL (ref 1.5–7.7)
NEUTROPHILS NFR BLD AUTO: 58.2 %
NITRITE UR QL STRIP.AUTO: NEGATIVE
NONHDLC SERPL-MCNC: 118 MG/DL (ref ?–130)
OSMOLALITY SERPL CALC.SUM OF ELEC: 295 MOSM/KG (ref 275–295)
PH UR: 7 [PH] (ref 5–8)
PLATELET # BLD AUTO: 167 10(3)UL (ref 150–450)
POTASSIUM SERPL-SCNC: 3.9 MMOL/L (ref 3.5–5.1)
PROT SERPL-MCNC: 7.2 G/DL (ref 6.4–8.2)
PROT UR-MCNC: NEGATIVE MG/DL
RBC # BLD AUTO: 4.29 X10(6)UL
SODIUM SERPL-SCNC: 142 MMOL/L (ref 136–145)
SP GR UR STRIP: 1.02 (ref 1–1.03)
TRIGL SERPL-MCNC: 68 MG/DL (ref 30–149)
TSI SER-ACNC: 1.78 MIU/ML (ref 0.36–3.74)
UROBILINOGEN UR STRIP-ACNC: <2
VIT C UR-MCNC: NEGATIVE MG/DL
VLDLC SERPL CALC-MCNC: 11 MG/DL (ref 0–30)
WBC # BLD AUTO: 5.9 X10(3) UL (ref 4–11)

## 2022-12-30 PROCEDURE — 80053 COMPREHEN METABOLIC PANEL: CPT

## 2022-12-30 PROCEDURE — 82274 ASSAY TEST FOR BLOOD FECAL: CPT

## 2022-12-30 PROCEDURE — 85025 COMPLETE CBC W/AUTO DIFF WBC: CPT

## 2022-12-30 PROCEDURE — 80061 LIPID PANEL: CPT

## 2022-12-30 PROCEDURE — 84443 ASSAY THYROID STIM HORMONE: CPT

## 2022-12-30 PROCEDURE — 81003 URINALYSIS AUTO W/O SCOPE: CPT

## 2023-12-09 ENCOUNTER — LAB ENCOUNTER (OUTPATIENT)
Dept: LAB | Age: 53
End: 2023-12-09
Attending: FAMILY MEDICINE
Payer: COMMERCIAL

## 2023-12-09 ENCOUNTER — OFFICE VISIT (OUTPATIENT)
Dept: FAMILY MEDICINE CLINIC | Facility: CLINIC | Age: 53
End: 2023-12-09
Payer: COMMERCIAL

## 2023-12-09 VITALS
WEIGHT: 140 LBS | HEIGHT: 65.5 IN | HEART RATE: 70 BPM | SYSTOLIC BLOOD PRESSURE: 118 MMHG | BODY MASS INDEX: 23.04 KG/M2 | TEMPERATURE: 98 F | DIASTOLIC BLOOD PRESSURE: 82 MMHG | RESPIRATION RATE: 14 BRPM

## 2023-12-09 DIAGNOSIS — Z12.11 SCREEN FOR COLON CANCER: ICD-10-CM

## 2023-12-09 DIAGNOSIS — Z00.00 PHYSICAL EXAM, ANNUAL: ICD-10-CM

## 2023-12-09 DIAGNOSIS — N95.0 POSTMENOPAUSAL BLEEDING: ICD-10-CM

## 2023-12-09 DIAGNOSIS — Z13.89 SCREENING FOR GENITOURINARY CONDITION: ICD-10-CM

## 2023-12-09 DIAGNOSIS — R19.5 POSITIVE FIT (FECAL IMMUNOCHEMICAL TEST): ICD-10-CM

## 2023-12-09 DIAGNOSIS — Z00.00 LABORATORY EXAMINATION ORDERED AS PART OF A ROUTINE GENERAL MEDICAL EXAMINATION: ICD-10-CM

## 2023-12-09 DIAGNOSIS — Z12.31 SCREENING MAMMOGRAM FOR BREAST CANCER: ICD-10-CM

## 2023-12-09 DIAGNOSIS — Z12.4 SCREENING FOR CERVICAL CANCER: ICD-10-CM

## 2023-12-09 DIAGNOSIS — Z00.00 PHYSICAL EXAM, ANNUAL: Primary | ICD-10-CM

## 2023-12-09 LAB
ALBUMIN SERPL-MCNC: 4 G/DL (ref 3.4–5)
ALBUMIN/GLOB SERPL: 1.2 {RATIO} (ref 1–2)
ALP LIVER SERPL-CCNC: 69 U/L
ALT SERPL-CCNC: 24 U/L
ANION GAP SERPL CALC-SCNC: 2 MMOL/L (ref 0–18)
AST SERPL-CCNC: 21 U/L (ref 15–37)
BASOPHILS # BLD AUTO: 0.02 X10(3) UL (ref 0–0.2)
BASOPHILS NFR BLD AUTO: 0.5 %
BILIRUB SERPL-MCNC: 1.7 MG/DL (ref 0.1–2)
BUN BLD-MCNC: 14 MG/DL (ref 9–23)
CALCIUM BLD-MCNC: 8.8 MG/DL (ref 8.5–10.1)
CHLORIDE SERPL-SCNC: 110 MMOL/L (ref 98–112)
CHOLEST SERPL-MCNC: 236 MG/DL (ref ?–200)
CO2 SERPL-SCNC: 28 MMOL/L (ref 21–32)
CREAT BLD-MCNC: 0.83 MG/DL
EGFRCR SERPLBLD CKD-EPI 2021: 85 ML/MIN/1.73M2 (ref 60–?)
EOSINOPHIL # BLD AUTO: 0.1 X10(3) UL (ref 0–0.7)
EOSINOPHIL NFR BLD AUTO: 2.5 %
ERYTHROCYTE [DISTWIDTH] IN BLOOD BY AUTOMATED COUNT: 13.2 %
FASTING PATIENT LIPID ANSWER: YES
FASTING STATUS PATIENT QL REPORTED: YES
GLOBULIN PLAS-MCNC: 3.3 G/DL (ref 2.8–4.4)
GLUCOSE BLD-MCNC: 94 MG/DL (ref 70–99)
HCT VFR BLD AUTO: 42.1 %
HDLC SERPL-MCNC: 117 MG/DL (ref 40–59)
HGB BLD-MCNC: 13.8 G/DL
IMM GRANULOCYTES # BLD AUTO: 0 X10(3) UL (ref 0–1)
IMM GRANULOCYTES NFR BLD: 0 %
LDLC SERPL CALC-MCNC: 113 MG/DL (ref ?–100)
LYMPHOCYTES # BLD AUTO: 1.58 X10(3) UL (ref 1–4)
LYMPHOCYTES NFR BLD AUTO: 39.5 %
MCH RBC QN AUTO: 31.4 PG (ref 26–34)
MCHC RBC AUTO-ENTMCNC: 32.8 G/DL (ref 31–37)
MCV RBC AUTO: 95.7 FL
MONOCYTES # BLD AUTO: 0.49 X10(3) UL (ref 0.1–1)
MONOCYTES NFR BLD AUTO: 12.3 %
NEUTROPHILS # BLD AUTO: 1.81 X10 (3) UL (ref 1.5–7.7)
NEUTROPHILS # BLD AUTO: 1.81 X10(3) UL (ref 1.5–7.7)
NEUTROPHILS NFR BLD AUTO: 45.2 %
NONHDLC SERPL-MCNC: 119 MG/DL (ref ?–130)
OSMOLALITY SERPL CALC.SUM OF ELEC: 290 MOSM/KG (ref 275–295)
PLATELET # BLD AUTO: 174 10(3)UL (ref 150–450)
POTASSIUM SERPL-SCNC: 4 MMOL/L (ref 3.5–5.1)
PROT SERPL-MCNC: 7.3 G/DL (ref 6.4–8.2)
RBC # BLD AUTO: 4.4 X10(6)UL
SODIUM SERPL-SCNC: 140 MMOL/L (ref 136–145)
TRIGL SERPL-MCNC: 38 MG/DL (ref 30–149)
TSI SER-ACNC: 1.35 MIU/ML (ref 0.36–3.74)
VLDLC SERPL CALC-MCNC: 6 MG/DL (ref 0–30)
WBC # BLD AUTO: 4 X10(3) UL (ref 4–11)

## 2023-12-09 PROCEDURE — 87624 HPV HI-RISK TYP POOLED RSLT: CPT | Performed by: FAMILY MEDICINE

## 2023-12-09 PROCEDURE — 99213 OFFICE O/P EST LOW 20 MIN: CPT | Performed by: FAMILY MEDICINE

## 2023-12-09 PROCEDURE — 85025 COMPLETE CBC W/AUTO DIFF WBC: CPT

## 2023-12-09 PROCEDURE — 84443 ASSAY THYROID STIM HORMONE: CPT

## 2023-12-09 PROCEDURE — 80061 LIPID PANEL: CPT

## 2023-12-09 PROCEDURE — 80053 COMPREHEN METABOLIC PANEL: CPT

## 2023-12-09 PROCEDURE — 88175 CYTOPATH C/V AUTO FLUID REDO: CPT | Performed by: FAMILY MEDICINE

## 2023-12-09 PROCEDURE — 99396 PREV VISIT EST AGE 40-64: CPT | Performed by: FAMILY MEDICINE

## 2023-12-09 PROCEDURE — 3008F BODY MASS INDEX DOCD: CPT | Performed by: FAMILY MEDICINE

## 2023-12-09 PROCEDURE — 3074F SYST BP LT 130 MM HG: CPT | Performed by: FAMILY MEDICINE

## 2023-12-09 PROCEDURE — 3079F DIAST BP 80-89 MM HG: CPT | Performed by: FAMILY MEDICINE

## 2023-12-09 PROCEDURE — 36415 COLL VENOUS BLD VENIPUNCTURE: CPT

## 2023-12-09 RX ORDER — IBUPROFEN 800 MG/1
800 TABLET ORAL EVERY 6 HOURS
COMMUNITY
Start: 2023-10-28

## 2023-12-09 NOTE — PATIENT INSTRUCTIONS
Kylah osborn. Call [phone] to schedule Mammogram I US pelvis. Umowic sie do ginekologa I gastroenterologa. Healthy diet. Stay active. Sprobowac Gummy fiber lub Metamucil. Sproboac Senna lub Xenna na zaparcia. Umowic sie na wizyte za 3 miesiace.

## 2023-12-11 LAB — HPV I/H RISK 1 DNA SPEC QL NAA+PROBE: NEGATIVE

## 2023-12-12 ENCOUNTER — APPOINTMENT (OUTPATIENT)
Dept: LAB | Age: 53
End: 2023-12-12
Attending: FAMILY MEDICINE
Payer: COMMERCIAL

## 2023-12-13 LAB
.: NORMAL
.: NORMAL

## 2023-12-20 ENCOUNTER — TELEPHONE (OUTPATIENT)
Dept: FAMILY MEDICINE CLINIC | Facility: CLINIC | Age: 53
End: 2023-12-20

## 2023-12-20 NOTE — TELEPHONE ENCOUNTER
Per referral department:         The Rendering Provider, Dr Mikhail Hamilton, is out of network with the patient's [facility] EXCHANGE insurance plan. I am unable to process this request at this time. Please update referral with an in network Rendering Provider and I will be happy to reprocess this referral request.  Please advise the patient to contact [facility] to obtain the name of an In Network Provider.      Attempted to reach patient to notify her that she will need to contact her insurance company to obtain a provider that is network for colon cancer screening.    Left message using polish  asking patient to return phone call.

## 2024-02-01 ENCOUNTER — OFFICE VISIT (OUTPATIENT)
Dept: FAMILY MEDICINE CLINIC | Facility: CLINIC | Age: 54
End: 2024-02-01
Payer: COMMERCIAL

## 2024-02-01 VITALS
TEMPERATURE: 98 F | OXYGEN SATURATION: 97 % | HEIGHT: 65.5 IN | RESPIRATION RATE: 16 BRPM | SYSTOLIC BLOOD PRESSURE: 114 MMHG | WEIGHT: 139 LBS | HEART RATE: 100 BPM | BODY MASS INDEX: 22.88 KG/M2 | DIASTOLIC BLOOD PRESSURE: 70 MMHG

## 2024-02-01 DIAGNOSIS — J06.9 VIRAL URI WITH COUGH: ICD-10-CM

## 2024-02-01 DIAGNOSIS — J02.9 SORE THROAT: Primary | ICD-10-CM

## 2024-02-01 LAB
CONTROL LINE PRESENT WITH A CLEAR BACKGROUND (YES/NO): YES YES/NO
STREP GRP A CUL-SCR: NEGATIVE

## 2024-02-01 PROCEDURE — 87880 STREP A ASSAY W/OPTIC: CPT | Performed by: NURSE PRACTITIONER

## 2024-02-01 PROCEDURE — 99213 OFFICE O/P EST LOW 20 MIN: CPT | Performed by: NURSE PRACTITIONER

## 2024-02-01 RX ORDER — IBUPROFEN 600 MG/1
600 TABLET ORAL EVERY 6 HOURS PRN
Qty: 30 TABLET | Refills: 0 | Status: SHIPPED | OUTPATIENT
Start: 2024-02-01 | End: 2024-02-05 | Stop reason: ALTCHOICE

## 2024-02-01 RX ORDER — BENZONATATE 200 MG/1
200 CAPSULE ORAL 3 TIMES DAILY PRN
Qty: 30 CAPSULE | Refills: 0 | Status: SHIPPED | OUTPATIENT
Start: 2024-02-01 | End: 2024-02-05

## 2024-02-01 NOTE — PATIENT INSTRUCTIONS
May take Ibuprofen 600 mg every 6 hours as needed for pain. Take with food.  May use Benzonatate capsule-take one every 8 hours as needed for cough/sore throat. May make you drowsy. If not helping by the third dose you may stop.  Salt water gargles (1/2 coffee mug full of water, microwave for one minute, add 1 teaspoon of salt and swish, gargle, spit until empty) will help reduce pain and swelling.  Hydrate with hot or cold drinks for comfort, wash hands or use hand hygiene often. Cover your cough or sneeze.  Follow up in two weeks for recheck if not 100% improved; seek immediate care if shortness of breath, inability to swallow saliva or breathe.

## 2024-02-01 NOTE — PROGRESS NOTES
HPI:   Stephenie Murrieta is a 53 year old female who presents with ill symptoms for  2  days. Patient reports sore throat, congestion, dry cough, OTC cold meds have been helping. Has tried Ibuprofen 400 mg with not much relief. No known sick contacts.    Current Outpatient Medications   Medication Sig Dispense Refill    ibuprofen 800 MG Oral Tab Take 1 tablet (800 mg total) by mouth every 6 (six) hours.      Ascorbic Acid (VITAMIN C) 1000 MG Oral Tab Take 1 tablet (1,000 mg total) by mouth daily.      Cholecalciferol (VITAMIN D) 50 MCG (2000 UT) Oral Cap Take 1 capsule (2,000 Units total) by mouth daily.      Omega-3 Fatty Acids (FISH OIL OR) Take by mouth.       No current facility-administered medications for this visit.      No past medical history on file.   No past surgical history on file.   Family History   Problem Relation Age of Onset    Heart Disorder Father     Other (liver/ETOH) Sister       Social History     Socioeconomic History    Marital status: Single   Tobacco Use    Smoking status: Never    Smokeless tobacco: Never   Vaping Use    Vaping Use: Never used   Substance and Sexual Activity    Alcohol use: Never    Drug use: Never    Sexual activity: Yes     Partners: Male   Other Topics Concern    Caffeine Concern No    Exercise Yes    Seat Belt Yes         REVIEW OF SYSTEMS:   GENERAL: feels well otherwise, lost voice  HEENT: congested, as above in HPI  LUNGS: denies shortness of breath with exertion  CARDIOVASCULAR: denies chest pain on exertion  GI: no nausea or abdominal pain, appetite normal but difficult to eat  NEURO: admits to headaches    EXAM:   /70   Pulse 100   Temp 98.3 °F (36.8 °C) (Oral)   Resp 16   Ht 5' 5.5\" (1.664 m)   Wt 139 lb (63 kg)   SpO2 97%   BMI 22.78 kg/m²   GENERAL: well developed, well nourished,in no apparent distress, voice hoarse  HEENT: atraumatic, normocephalic,ears with normal appearing TMs, opaque, nares with erythema and mild rhinorrhea, throat normal  appearing. Uvuvla midline.    NECK: supple,posterior cervical adenopathy  LUNGS: clear to auscultation  CARDIO: RRR without murmur  Results for orders placed or performed in visit on 02/01/24   Strep A Assay W/Optic    Collection Time: 02/01/24  8:38 AM   Result Value Ref Range    Strep Grp A Screen Negative Negative    Control Line Present with a clear background (yes/no) Yes Yes/No    Kit Lot # [id number] Numeric    Kit Expiration Date 4/22/25 Date         ASSESSMENT AND PLAN:    PLAN:Stephenie was seen today for sore throat.    Diagnoses and all orders for this visit:    Sore throat  -     Strep A Assay W/Optic  -     benzonatate 200 MG Oral Cap; Take 1 capsule (200 mg total) by mouth 3 (three) times daily as needed for cough.  -     ibuprofen 600 MG Oral Tab; Take 1 tablet (600 mg total) by mouth every 6 (six) hours as needed for Pain.    Viral URI with cough    Negative rapid strep. No culture sent.  OTC decongestants, throat lozenges and Tylenol . Self care discussed. Medication use and risk/benefit discussed. Patient is advised to follow up with PCP if not improving with treatment plan or seek immediate care if symptoms worsen.  The patient indicates understanding of these issues and agrees to the plan.  Patient Instructions   May take Ibuprofen 600 mg every 6 hours as needed for pain. Take with food.  May use Benzonatate capsule-take one every 8 hours as needed for cough/sore throat. May make you drowsy. If not helping by the third dose you may stop.  Salt water gargles (1/2 coffee mug full of water, microwave for one minute, add 1 teaspoon of salt and swish, gargle, spit until empty) will help reduce pain and swelling.  Hydrate with hot or cold drinks for comfort, wash hands or use hand hygiene often. Cover your cough or sneeze.  Follow up in two weeks for recheck if not 100% improved; seek immediate care if shortness of breath, inability to swallow saliva or breathe.

## 2024-02-02 ENCOUNTER — HOSPITAL ENCOUNTER (OUTPATIENT)
Age: 54
Discharge: HOME | End: 2024-02-02
Payer: COMMERCIAL

## 2024-02-02 ENCOUNTER — OFFICE VISIT (OUTPATIENT)
Dept: FAMILY MEDICINE CLINIC | Facility: CLINIC | Age: 54
End: 2024-02-02
Payer: COMMERCIAL

## 2024-02-02 VITALS
HEART RATE: 100 BPM | HEIGHT: 65.5 IN | SYSTOLIC BLOOD PRESSURE: 106 MMHG | DIASTOLIC BLOOD PRESSURE: 70 MMHG | RESPIRATION RATE: 16 BRPM | BODY MASS INDEX: 22.88 KG/M2 | OXYGEN SATURATION: 98 % | WEIGHT: 139 LBS | TEMPERATURE: 99 F

## 2024-02-02 VITALS
WEIGHT: 140 LBS | HEART RATE: 90 BPM | HEIGHT: 66 IN | DIASTOLIC BLOOD PRESSURE: 84 MMHG | RESPIRATION RATE: 16 BRPM | BODY MASS INDEX: 22.5 KG/M2 | TEMPERATURE: 98 F | SYSTOLIC BLOOD PRESSURE: 121 MMHG | OXYGEN SATURATION: 99 %

## 2024-02-02 DIAGNOSIS — J02.9 SORE THROAT: Primary | ICD-10-CM

## 2024-02-02 DIAGNOSIS — H92.09 OTALGIA, UNSPECIFIED LATERALITY: ICD-10-CM

## 2024-02-02 DIAGNOSIS — J02.9 VIRAL PHARYNGITIS: Primary | ICD-10-CM

## 2024-02-02 LAB
CONTROL LINE PRESENT WITH A CLEAR BACKGROUND (YES/NO): YES YES/NO
STREP GRP A CUL-SCR: NEGATIVE

## 2024-02-02 PROCEDURE — 99213 OFFICE O/P EST LOW 20 MIN: CPT | Performed by: NURSE PRACTITIONER

## 2024-02-02 PROCEDURE — 87081 CULTURE SCREEN ONLY: CPT | Performed by: NURSE PRACTITIONER

## 2024-02-02 PROCEDURE — 99213 OFFICE O/P EST LOW 20 MIN: CPT

## 2024-02-02 PROCEDURE — 87880 STREP A ASSAY W/OPTIC: CPT | Performed by: NURSE PRACTITIONER

## 2024-02-02 RX ORDER — LIDOCAINE HYDROCHLORIDE 20 MG/ML
10 SOLUTION OROPHARYNGEAL ONCE
Status: COMPLETED | OUTPATIENT
Start: 2024-02-02 | End: 2024-02-02

## 2024-02-02 RX ORDER — LIDOCAINE HYDROCHLORIDE 20 MG/ML
5 SOLUTION OROPHARYNGEAL
Qty: 100 ML | Refills: 0 | Status: SHIPPED | OUTPATIENT
Start: 2024-02-02

## 2024-02-02 RX ORDER — NAPROXEN 500 MG/1
500 TABLET ORAL 2 TIMES DAILY PRN
Qty: 20 TABLET | Refills: 0 | Status: SHIPPED | OUTPATIENT
Start: 2024-02-02

## 2024-02-02 NOTE — DISCHARGE INSTRUCTIONS
Sore throat care measures   - Wash hands often   - Disinfect your environment, especially high-touch items   - Drink plenty of fluids to keep your throat moist   - Get plenty of rest   - Do not share utensils or drinks   - Change toothbrush   - You may benefit from salt water gargles throughout the day   - Naproxen as needed for pain (take with or after food; do not take additional Ibuprofen)   - Viscous lidocaine as needed for sore throat   - You may benefit from throat lozenges for throat pain (e.g. Cepacol, Ricola, etc.)   - Follow up with your primary care provider as needed

## 2024-02-02 NOTE — PROGRESS NOTES
HPI:   Stephenie Murrieta is a 53 year old female who presents with ill symptoms for  3  days. Patient was seen in the walk in clinic yesterday for sore throat, not relieved by Ibuprofen, complaint today is burning of sore throat with pain up to the ears, 7 on 0-10 scale with no relief from medications.     Current Outpatient Medications   Medication Sig Dispense Refill    benzonatate 200 MG Oral Cap Take 1 capsule (200 mg total) by mouth 3 (three) times daily as needed for cough. 30 capsule 0    ibuprofen 600 MG Oral Tab Take 1 tablet (600 mg total) by mouth every 6 (six) hours as needed for Pain. 30 tablet 0    ibuprofen 800 MG Oral Tab Take 1 tablet (800 mg total) by mouth every 6 (six) hours.      Ascorbic Acid (VITAMIN C) 1000 MG Oral Tab Take 1 tablet (1,000 mg total) by mouth daily.      Cholecalciferol (VITAMIN D) 50 MCG (2000 UT) Oral Cap Take 1 capsule (2,000 Units total) by mouth daily.      Omega-3 Fatty Acids (FISH OIL OR) Take by mouth.       No current facility-administered medications for this visit.      No past medical history on file.   No past surgical history on file.   Family History   Problem Relation Age of Onset    Heart Disorder Father     Other (liver/ETOH) Sister       Social History     Socioeconomic History    Marital status: Single   Tobacco Use    Smoking status: Never    Smokeless tobacco: Never   Vaping Use    Vaping Use: Never used   Substance and Sexual Activity    Alcohol use: Never    Drug use: Never    Sexual activity: Yes     Partners: Male   Other Topics Concern    Caffeine Concern No    Exercise Yes    Seat Belt Yes         REVIEW OF SYSTEMS:   GENERAL: feels well otherwise, fatigued in general  HEENT: not congested, as above in HPI  LUNGS: denies shortness of breath with exertion  CARDIOVASCULAR: denies chest pain on exertion  GI: no nausea or abdominal pain, appetite down due to sore throat  NEURO: denies headaches    EXAM:   /70   Pulse 100   Temp 98.8 °F (37.1 °C)  (Oral)   Resp 16   Ht 5' 5.5\" (1.664 m)   Wt 139 lb (63 kg)   SpO2 98%   BMI 22.78 kg/m²   GENERAL: well developed, well nourished,in no apparent distress, voice hoarse  HEENT: atraumatic, normocephalic, throat normal appearing. Uvuvla midline.  NECK: supple,posterior cervical adenopathy  LUNGS: clear to auscultation all lobes  CARDIO: RRR without murmur  Results for orders placed or performed in visit on 02/02/24   Strep A Assay W/Optic    Collection Time: 02/02/24  8:59 AM   Result Value Ref Range    Strep Grp A Screen negative Negative    Control Line Present with a clear background (yes/no) yes Yes/No    Kit Lot # [id number] Numeric    Kit Expiration Date 4/22/25 Date         ASSESSMENT AND PLAN:    PLAN:Stephenei was seen today for sore throat.    Diagnoses and all orders for this visit:    Sore throat  -     Strep A Assay W/Optic    Otalgia, unspecified laterality  -     Strep A Assay W/Optic      Negative rapid strep, will send throat culture per patient request. Patient refusing Covid testing today, long discussion of antibiotic use for bacteria and illness is likely viral. She feels very \"weak\" since having Covid vaccines and declines Covid testing due to beliefs. Patient would like to proceed to higher level of care for pain control. Will possibly go to [facility] later for treatment and evaluation.   There are no Patient Instructions on file for this visit.

## 2024-02-02 NOTE — ED PROVIDER NOTES
History     Chief Complaint   Patient presents with    Sore Throat       Subjective:   HPI    Stephenie Murrieta, 53 year old female with notable medical history of n/a who presents with sore throat. Patient reports sore throat for the past couple days w/o associated fever, n/v, HA, body aches, SOB. She was seen at [facility] yesterday, had negative strep test, and was advised on 600mg Ibuprofen. She reports no relief from Ibuprofen and returned to [facility] earlier today and had a culture sent per request. Patient came to  today for alternative therapy.         Objective:   History reviewed. No pertinent past medical history.           History reviewed. No pertinent surgical history.             Social History     Socioeconomic History    Marital status: Single   Tobacco Use    Smoking status: Never    Smokeless tobacco: Never   Vaping Use    Vaping Use: Never used   Substance and Sexual Activity    Alcohol use: Never    Drug use: Never    Sexual activity: Yes     Partners: Male   Other Topics Concern    Caffeine Concern No    Exercise Yes    Seat Belt Yes              Review of Systems   HENT:  Positive for sore throat.    All other systems reviewed and are negative.        Constitutional and vital signs reviewed.      All other systems reviewed and negative except as noted above.    I have reviewed the family history, social history, allergies, and outpatient medications.     History reviewed from EMR: Encounters, problem list, allergies, medications      Physical Exam     ED Triage Vitals [02/02/24 0935]   /84   Pulse 90   Resp 16   Temp 97.8 °F (36.6 °C)   Temp src Temporal   SpO2 99 %   O2 Device None (Room air)       Current:/84   Pulse 90   Temp 97.8 °F (36.6 °C) (Temporal)   Resp 16   Ht 167.6 cm (5' 6\")   Wt 63.5 kg   SpO2 99%   BMI 22.60 kg/m²       Physical Exam  Vitals and nursing note reviewed.   Constitutional:       General: She is not in acute distress.     Appearance: Normal appearance. She  is normal weight. She is not ill-appearing or toxic-appearing.   HENT:      Head: Normocephalic and atraumatic.      Right Ear: External ear normal.      Left Ear: External ear normal.      Nose: Nose normal.      Mouth/Throat:      Mouth: Mucous membranes are moist.      Pharynx: Oropharynx is clear. Uvula midline.      Tonsils: No tonsillar exudate. 0 on the right. 0 on the left.      Comments: No tonsillar edema / exudate / swelling. No deep erythema or petechiae.   Eyes:      Extraocular Movements: Extraocular movements intact.      Conjunctiva/sclera: Conjunctivae normal.      Pupils: Pupils are equal, round, and reactive to light.   Cardiovascular:      Rate and Rhythm: Normal rate.      Pulses: Normal pulses.   Pulmonary:      Effort: Pulmonary effort is normal. No respiratory distress.   Musculoskeletal:         General: No swelling, tenderness or signs of injury. Normal range of motion.      Cervical back: Normal range of motion and neck supple.   Skin:     General: Skin is warm and dry.      Capillary Refill: Capillary refill takes less than 2 seconds.      Coloration: Skin is not jaundiced.   Neurological:      General: No focal deficit present.      Mental Status: She is alert and oriented to person, place, and time. Mental status is at baseline.   Psychiatric:         Mood and Affect: Mood normal.         Behavior: Behavior normal.         Thought Content: Thought content normal.         Judgment: Judgment normal.            ED Course     Labs Reviewed - No data to display  No orders to display       Vitals:    02/02/24 0935   BP: 121/84   Pulse: 90   Resp: 16   Temp: 97.8 °F (36.6 °C)   TempSrc: Temporal   SpO2: 99%   Weight: 63.5 kg   Height: 167.6 cm (5' 6\")            [facility]        Stephenie Murrieta, 53 year old female with medical history as noted above who presents with sore throat   - Patient in NAD, VSS   - Viral vs strep vs other   - PATIENT with negative strep yesterday, wich culture sent from [facility]  earlier today   - Viscous lidocaine in IC and Rx for same   - Supportive care and infection control measures discussed   - f/u with primary care provider as needed        ** See ED course for additional information on care provided / interventions / notable events throughout patient's encounter.    ** See below for home care instructions (if applicable)         I have independently reviewed the radiology images, clinical lab results, and ECG tracings as described above (if applicable)        Medical Decision Making  Risk  OTC drugs.  Prescription drug management.        Disposition and Plan     Clinical Impression:  1. Viral pharyngitis         Disposition:  Discharge  2/2/2024  9:58 am    Follow-up:  Shefali Garcia MD  [facility] [place] [id number]  [phone]      As needed          Medications Prescribed:  Current Discharge Medication List        START taking these medications    Details   Lidocaine Viscous HCl 2 % Mouth/Throat Solution Take 5 mL by mouth every 3 (three) hours as needed for Pain. Spit or swallow  Qty: 100 mL, Refills: 0      naproxen 500 MG Oral Tab Take 1 tablet (500 mg total) by mouth 2 (two) times daily as needed.  Qty: 20 tablet, Refills: 0             The above patient (and/or guardian) was made aware that an appropriate evaluation has been performed, and that no additional testing is required at this time. In my medical judgment, there is currently no evidence of an immediate life-threatening or surgical condition, therefore discharge is indicated at this time. The patient (and/or guardian) was advised that a small risk still exists that a serious condition could develop. The patient was instructed to arrange close follow-up with their primary care provider (or the referral provider given today). The patient received written and verbal instructions regarding their condition / concerns, demonstrated understanding, and is agreement with the outpatient treatment plan.         Home care instructions:    Sore throat care measures   - Wash hands often   - Disinfect your environment, especially high-touch items   - Drink plenty of fluids to keep your throat moist   - Get plenty of rest   - Do not share utensils or drinks   - Change toothbrush   - You may benefit from salt water gargles throughout the day   - Naproxen as needed for pain (take with or after food; do not take additional Ibuprofen)   - Viscous lidocaine as needed for sore throat   - You may benefit from throat lozenges for throat pain (e.g. Cepacol, Ricola, etc.)   - Follow up with your primary care provider as needed      Jagdish Perry, DNP, APRN, AGACNP-BC, FNP-C, CNL  Adult-Gerontology Acute Care & Family Nurse Practitioner  [facility]

## 2024-02-05 ENCOUNTER — OFFICE VISIT (OUTPATIENT)
Dept: FAMILY MEDICINE CLINIC | Facility: CLINIC | Age: 54
End: 2024-02-05
Payer: COMMERCIAL

## 2024-02-05 ENCOUNTER — TELEPHONE (OUTPATIENT)
Dept: FAMILY MEDICINE CLINIC | Facility: CLINIC | Age: 54
End: 2024-02-05

## 2024-02-05 VITALS
WEIGHT: 142 LBS | BODY MASS INDEX: 22.82 KG/M2 | HEART RATE: 88 BPM | RESPIRATION RATE: 18 BRPM | HEIGHT: 66 IN | DIASTOLIC BLOOD PRESSURE: 74 MMHG | SYSTOLIC BLOOD PRESSURE: 126 MMHG | TEMPERATURE: 98 F | OXYGEN SATURATION: 96 %

## 2024-02-05 DIAGNOSIS — N95.0 POSTMENOPAUSAL BLEEDING: ICD-10-CM

## 2024-02-05 DIAGNOSIS — J02.9 SORE THROAT: ICD-10-CM

## 2024-02-05 DIAGNOSIS — R05.1 ACUTE COUGH: Primary | ICD-10-CM

## 2024-02-05 DIAGNOSIS — R09.82 POSTNASAL DRIP: ICD-10-CM

## 2024-02-05 PROCEDURE — 99214 OFFICE O/P EST MOD 30 MIN: CPT | Performed by: FAMILY MEDICINE

## 2024-02-05 RX ORDER — AMOXICILLIN AND CLAVULANATE POTASSIUM 875; 125 MG/1; MG/1
1 TABLET, FILM COATED ORAL 2 TIMES DAILY
Qty: 20 TABLET | Refills: 0 | Status: SHIPPED | OUTPATIENT
Start: 2024-02-05 | End: 2024-02-15

## 2024-02-05 RX ORDER — FLUTICASONE PROPIONATE 50 MCG
2 SPRAY, SUSPENSION (ML) NASAL DAILY
Qty: 1 EACH | Refills: 0 | Status: SHIPPED | OUTPATIENT
Start: 2024-02-05 | End: 2025-01-30

## 2024-02-05 NOTE — TELEPHONE ENCOUNTER
Pt has had a cough for the past week. Not able to sleep from it. Pt went to [facility] and was given ibuprofen. Wants to know if anything can be sent to pharmacy. Please advise

## 2024-02-05 NOTE — PATIENT INSTRUCTIONS
Augmentin 875 mg 1 tablet rano I wieczorem,   Probiotyk - organic yogurt.   Flonase - 2 sprays do kazdej dziurki,   Pic duzo [facility]. Mucinex- na jamar robledo.   Call [phone] to schedule Mammogram I US pelvis.

## 2024-02-05 NOTE — TELEPHONE ENCOUNTER
I called the pt with the assistance of a Polish . ID # [id number]. Pt ws in the [facility] 02/02/24 with a ST. That has resolved. She also had a slight cough at that time. The cough has gotten worse over the past several days. It I a dry cough that is keeping her up at night. She has no fever, no chest pain or tightness and no shortness of breath. She using an OTC cough syrup snd drinking tea with lemon and honey with no relief. She would like something for the cough.

## 2024-02-05 NOTE — PROGRESS NOTES
Stephenie Murrieta is a 53 year old female.  Cough sore throat congestion  HPI:   Patient is not feeling for a week.  Initially she has a sinus congestion stuffiness sore throat.  She went to an urgent care and immediate care 3 times.  Strep test was negative.  She did not do COVID test.  Has some sinus congestion stuffiness postnasal drip chest some colored secretions.  Cough is mostly dry occasionally getting some secretions.  No shortness of breath.  No fever.  No ear pain.  She was taking some over-the-counter medications.  Does not feel that she is getting better she feels that she needs antibiotic in the past she would always get antibiotic when she was in [place].  Urination is normal bowel movements are normal.  She was given lidocaine viscous but did not want use medication.    Patient had ultrasound of the pelvis ordered for postmenopausal bleeding was never completed strongly recommended to have it done.  Also recommended to get a colonoscopy completed.  The second fit test came back negative but1 year ago she had positive test. Pt wants  the risk associated with not doing colonoscopy including missing diagnosis of colon cancer.  Current Outpatient Medications   Medication Sig Dispense Refill   • amoxicillin clavulanate 875-125 MG Oral Tab Take 1 tablet by mouth 2 (two) times daily for 10 days. 20 tablet 0   • fluticasone propionate 50 MCG/ACT Nasal Suspension 2 sprays by Each Nare route daily. 1 each 0   • Lidocaine Viscous HCl 2 % Mouth/Throat Solution Take 5 mL by mouth every 3 (three) hours as needed for Pain. Spit or swallow 100 mL 0   • naproxen 500 MG Oral Tab Take 1 tablet (500 mg total) by mouth 2 (two) times daily as needed. 20 tablet 0   • Ascorbic Acid (VITAMIN C) 1000 MG Oral Tab Take 1 tablet (1,000 mg total) by mouth daily.     • Cholecalciferol (VITAMIN D) 50 MCG (2000 UT) Oral Cap Take 1 capsule (2,000 Units total) by mouth daily.     • Omega-3 Fatty Acids (FISH OIL OR) Take by mouth.     •  ibuprofen 800 MG Oral Tab Take 1 tablet (800 mg total) by mouth every 6 (six) hours.        History reviewed. No pertinent past medical history.   Social History:  Social History     Socioeconomic History   • Marital status: Single   Tobacco Use   • Smoking status: Never   • Smokeless tobacco: Never   Vaping Use   • Vaping Use: Never used   Substance and Sexual Activity   • Alcohol use: Never   • Drug use: Never   • Sexual activity: Yes     Partners: Male   Other Topics Concern   • Caffeine Concern No   • Exercise Yes   • Seat Belt Yes        REVIEW OF SYSTEMS:   GENERAL HEALTH: feels well otherwise congested  SKIN: denies any unusual skin lesions or rashes  HEENT  nasal congestion stuffiness postnasal drip sore throat  Neck no neck pain  RESPIRATORY: denies shortness of breath with exertion having cough mostly dry sometimes secretions  CARDIOVASCULAR: denies chest pain on exertion  GI: denies abdominal pain and denies heartburn  NEURO: denies headaches  Psych normal mood.    EXAM:   /74 (BP Location: Left arm, Patient Position: Sitting, Cuff Size: adult)   Pulse 88   Temp 98.2 °F (36.8 °C) (Temporal)   Resp 18   Ht 5' 6\" (1.676 m)   Wt 142 lb (64.4 kg)   SpO2 96%   BMI 22.92 kg/m²   GENERAL: well developed, well nourished,in no apparent distress, congested  SKIN: no rashes,no suspicious lesions  HEENT: atraumatic, normocephalic,ears-fluid behind tympanic members, irritation of the posterior throat and nasal mucosa   NECK: supple,no adenopathy  LUNGS: clear to auscultation no wheezes no crackles  CARDIO: RRR without murmur  GI: good BS's,no masses, HSM or tenderness  EXTREMITIES: no cyanosis, clubbing or edema  Psychiatric - alert  and oriented x3, normal mood     ASSESSMENT AND PLAN:     Encounter Diagnoses   Name Primary?   • Acute cough Yes   • Postnasal drip    • Sore throat    • Postmenopausal bleeding        No orders of the defined types were placed in this encounter.      Meds & Refills for this  Visit:  Requested Prescriptions     Signed Prescriptions Disp Refills   • amoxicillin clavulanate 875-125 MG Oral Tab 20 tablet 0     Sig: Take 1 tablet by mouth 2 (two) times daily for 10 days.   • fluticasone propionate 50 MCG/ACT Nasal Suspension 1 each 0     Si sprays by Each Nare route daily.    Augmentin 875 mg 1 tablet rano I wieczorem,   Probiotyk - organic yogurt.   Flonase - 2 sprays do kazdej dziurki,   Pic duzo [facility]. Mucinex- na jamar robledo.   Call [phone] to schedule Mammogram I US pelvis.     Augmentin 1 tablet twice a day.  Take probiotic daily like organic yogurt.  Flonase 2 sprays nostril once a day.  Keep good hydration.  Use Mucinex for cough as needed.  Call [phone] to schedule mammogram and ultrasound of the pelvis.  Discussed with patient also need for completing colonoscopy.      Imaging & Consults:  None    The patient indicates understanding of these issues and agrees to the plan.  The patient is asked to return in as needed if not better.  Follow-up for complete physical.  The note was dictated using speech recognition software.  Accuracy and grammar in transcription may be subject to error.

## 2024-02-08 ENCOUNTER — TELEPHONE (OUTPATIENT)
Dept: FAMILY MEDICINE CLINIC | Facility: CLINIC | Age: 54
End: 2024-02-08

## 2024-02-08 RX ORDER — FLUTICASONE PROPIONATE AND SALMETEROL 250; 50 UG/1; UG/1
1 POWDER RESPIRATORY (INHALATION) EVERY 12 HOURS SCHEDULED
Qty: 1 EACH | Refills: 0 | Status: SHIPPED | OUTPATIENT
Start: 2024-02-08

## 2024-02-08 RX ORDER — PREDNISONE 10 MG/1
TABLET ORAL
Qty: 15 TABLET | Refills: 0 | Status: SHIPPED | OUTPATIENT
Start: 2024-02-08

## 2024-02-08 NOTE — TELEPHONE ENCOUNTER
I have called in Advair discus inhaler use 1 puff twice a day.  I have also called prednisone 10 mg tablets take 3 tablets once a day for 5 days.  If cough continues to worsen let us know may need to be reevaluated.Thanks.

## 2024-02-08 NOTE — TELEPHONE ENCOUNTER
I called the pt and informed her of the following :     Dr. Garcia called in Advair discus inhaler use 1 puff twice a day.  she also prescribed prednisone 10 mg tablets take 3 tablets once a day for 5 days.  If cough continues to worsen let us know you may need to be reevaluated. Pt. Agreed to plan and verbalized understanding

## 2024-02-08 NOTE — TELEPHONE ENCOUNTER
Pt calling to report that her cough is getting worse and she doesn't think the antibiotics are strong enough. Pt is requesting a cough syrup for her very dry cough.

## 2024-02-09 ENCOUNTER — TELEPHONE (OUTPATIENT)
Dept: FAMILY MEDICINE CLINIC | Facility: CLINIC | Age: 54
End: 2024-02-09

## 2024-02-09 NOTE — TELEPHONE ENCOUNTER
Pt states she has been sick for two weeks and the medicine she was given is not helping. Patient questioning whether the medication should be changed and states it is \"too light\". Pt would like to speak to nurse

## 2024-02-09 NOTE — TELEPHONE ENCOUNTER
S/W pt and she C/O dry cough and has tried Nyquil is not working.  Pt is asking for a stronger cough med/syrup.

## 2024-02-09 NOTE — TELEPHONE ENCOUNTER
Zahra BOWMAN already did talk with her yesterday regarding recommendations for her cough.  I would like her to try those meds over the weekend , thank you

## 2024-09-10 ENCOUNTER — PATIENT OUTREACH (OUTPATIENT)
Dept: FAMILY MEDICINE CLINIC | Facility: CLINIC | Age: 54
End: 2024-09-10

## 2024-10-22 DIAGNOSIS — Z12.11 SCREENING FOR COLON CANCER: Primary | ICD-10-CM

## 2025-01-22 ENCOUNTER — LAB ENCOUNTER (OUTPATIENT)
Dept: LAB | Age: 55
End: 2025-01-22
Attending: FAMILY MEDICINE
Payer: COMMERCIAL

## (undated) NOTE — LETTER
Patient Name: Vladislav Jackson  YOB: 1970          MRN :  [id number]  Date:  3/11/2021  Referring Physician:  Maria Del Rosario Joya    Progress Summary  Pt has attended 6,visits in Physical Therapy.      Subjective: No significant pain relief with PT so agreed to actively participate in planning and for this course of care. Thank you for your referral. If you have any questions, please contact me at Dept: [phone].     Sincerely,  Electronically signed by therapist: Glenn Deshpande PT    [de-identified]

## (undated) NOTE — LETTER
Patient Name: Balbina Doss  YOB: 1970          MRN number:  [id number]  Date:  2/22/2021  Referring Physician:  Karla Weinstein         SPINE EVALUATION:    Referring Physician: Dr. Naz Belcher  Diagnosis: R LBP     Date of Service: 2/22/2021 Precautions:  None  OBJECTIVE:   Observation/Posture: amb ind . Present with her son  Neuro Screen: Neg neurotension tests, neg SLR and Neg slump test    Trunk AROM: WFL but with pain provocation at end range flexion and R sidebend in wt bearing.     [place] Frequency / Duration: Patient will be seen for 2 x/week or a total of 8 visits over a 90 day period.  Treatment will include: Manual Therapy, Self-Care Home Management, Therapeutic Activities, Therapeutic Exercise, Home Exercise Program instruction and Moda